# Patient Record
Sex: FEMALE | Race: WHITE | NOT HISPANIC OR LATINO | Employment: OTHER | URBAN - METROPOLITAN AREA
[De-identification: names, ages, dates, MRNs, and addresses within clinical notes are randomized per-mention and may not be internally consistent; named-entity substitution may affect disease eponyms.]

---

## 2017-01-12 ENCOUNTER — OFFICE VISIT (OUTPATIENT)
Dept: PULMONOLOGY | Facility: CLINIC | Age: 67
End: 2017-01-12
Payer: MEDICARE

## 2017-01-12 ENCOUNTER — HOSPITAL ENCOUNTER (OUTPATIENT)
Dept: RADIOLOGY | Facility: HOSPITAL | Age: 67
Discharge: HOME | End: 2017-01-12
Attending: INTERNAL MEDICINE
Payer: MEDICARE

## 2017-01-12 VITALS
HEART RATE: 50 BPM | DIASTOLIC BLOOD PRESSURE: 76 MMHG | WEIGHT: 186.94 LBS | OXYGEN SATURATION: 99 % | SYSTOLIC BLOOD PRESSURE: 122 MMHG | BODY MASS INDEX: 31.91 KG/M2 | RESPIRATION RATE: 18 BRPM | HEIGHT: 64 IN

## 2017-01-12 DIAGNOSIS — R06.00 DYSPNEA AND RESPIRATORY ABNORMALITIES: Primary | ICD-10-CM

## 2017-01-12 DIAGNOSIS — R06.00 DYSPNEA AND RESPIRATORY ABNORMALITIES: ICD-10-CM

## 2017-01-12 DIAGNOSIS — R06.89 DYSPNEA AND RESPIRATORY ABNORMALITIES: Primary | ICD-10-CM

## 2017-01-12 DIAGNOSIS — R06.89 DYSPNEA AND RESPIRATORY ABNORMALITIES: ICD-10-CM

## 2017-01-12 PROCEDURE — 1157F ADVNC CARE PLAN IN RCRD: CPT | Mod: S$GLB,,, | Performed by: INTERNAL MEDICINE

## 2017-01-12 PROCEDURE — 99215 OFFICE O/P EST HI 40 MIN: CPT | Mod: S$GLB,,, | Performed by: INTERNAL MEDICINE

## 2017-01-12 PROCEDURE — 1159F MED LIST DOCD IN RCRD: CPT | Mod: S$GLB,,, | Performed by: INTERNAL MEDICINE

## 2017-01-12 PROCEDURE — 1160F RVW MEDS BY RX/DR IN RCRD: CPT | Mod: S$GLB,,, | Performed by: INTERNAL MEDICINE

## 2017-01-12 PROCEDURE — 71020 XR CHEST PA AND LATERAL: CPT | Mod: TC

## 2017-01-12 PROCEDURE — 71020 XR CHEST PA AND LATERAL: CPT | Mod: 26,,, | Performed by: RADIOLOGY

## 2017-01-12 PROCEDURE — 99999 PR PBB SHADOW E&M-EST. PATIENT-LVL III: CPT | Mod: PBBFAC,,, | Performed by: INTERNAL MEDICINE

## 2017-01-12 RX ORDER — HYDROCODONE BITARTRATE AND ACETAMINOPHEN 5; 325 MG/1; MG/1
TABLET ORAL
Refills: 0 | COMMUNITY
Start: 2016-11-08 | End: 2017-07-19 | Stop reason: CLARIF

## 2017-01-12 RX ORDER — FLUTICASONE PROPIONATE AND SALMETEROL 250; 50 UG/1; UG/1
1 POWDER RESPIRATORY (INHALATION) 2 TIMES DAILY
COMMUNITY
End: 2017-01-12

## 2017-01-12 RX ORDER — DUREZOL 0.5 MG/ML
EMULSION OPHTHALMIC
Refills: 1 | COMMUNITY
Start: 2016-12-22 | End: 2017-01-12

## 2017-01-12 RX ORDER — FERROUS SULFATE 325(65) MG
325 TABLET ORAL
COMMUNITY

## 2017-01-12 RX ORDER — ALPRAZOLAM 1 MG/1
TABLET ORAL
Refills: 3 | COMMUNITY
Start: 2016-12-30

## 2017-01-12 NOTE — ASSESSMENT & PLAN NOTE
Intermittent wheezing.   CXR obtained and reviewed. She has pulmonary edema. In addition to her bi pedal edema suggests volume overload.   Bronchial challenge with methacholine to rule out Asthma scheduled 02/06/17.

## 2017-01-12 NOTE — PROGRESS NOTES
Subjective:      Patient ID: Fang Cervantes is a 66 y.o. female.    Patient Active Problem List   Diagnosis    Dyspnea and respiratory abnormalities    Decreased diffusion capacity of lung     Problem list has been reviewed.    Chief Complaint: Wheezing (c/o sob x 1wk)    HPI    She reports wheezing for the past 1 week. She denies cough, fevers or chills. She reports associated progressive dyspnea with exertion. She reports stable weight. She does not have COPD.      Previous Report Reviewed: office notes     The following portions of the patient's history were reviewed and updated as appropriate: She  has no past medical history on file.  She  has no past surgical history on file.  Her family history is not on file.  She  reports that she has never smoked. She does not have any smokeless tobacco history on file. She reports that she does not drink alcohol. Her drug history is not on file.  She has a current medication list which includes the following prescription(s): alprazolam, atorvastatin, bystolic, calcitriol, clopidogrel, epoetin fanta, ferrous sulfate, furosemide, losartan, nitroglycerin, oxycodone-acetaminophen, vitamin d2, amlodipine, hydrocodone-acetaminophen 5-325mg, and ventolin hfa.  She is allergic to aspirin; cephalosporins; codeine; and pcn [penicillins]..    Review of Systems   Constitutional: Positive for fatigue. Negative for fever, chills and night sweats.   HENT: Positive for rhinorrhea and sinus pressure. Negative for nosebleeds, postnasal drip, sore throat, trouble swallowing, ear pain and hearing loss.    Eyes: Negative for itching.   Respiratory: Positive for wheezing, orthopnea, dyspnea on extertion and use of rescue inhaler. Negative for cough, sputum production, chest tightness and Paroxysmal Nocturnal Dyspnea.    Cardiovascular: Negative for chest pain, palpitations and leg swelling.   Genitourinary: Negative for difficulty urinating and hematuria.   Endocrine: Negative for  "polydipsia and heat intolerance.    Musculoskeletal: Positive for arthralgias.   Skin: Negative for rash.   Gastrointestinal: Positive for acid reflux. Negative for nausea, vomiting, abdominal pain and abdominal distention.   Neurological: Positive for headaches. Negative for dizziness and light-headedness.   Hematological: Bleeds easily and excessive bruising.   Psychiatric/Behavioral: The patient is nervous/anxious.    All other systems reviewed and are negative.     Objective:     Visit Vitals    /76    Pulse (!) 50    Resp 18    Ht 5' 4" (1.626 m)    Wt 84.8 kg (186 lb 15.2 oz)    LMP  (LMP Unknown)    SpO2 99%    BMI 32.09 kg/m2     Body mass index is 32.09 kg/(m^2).    Physical Exam   Constitutional: She is oriented to person, place, and time. She appears well-developed and well-nourished.   HENT:   Head: Normocephalic and atraumatic.   Eyes: EOM are normal. Pupils are equal, round, and reactive to light.   Neck: Normal range of motion. Neck supple.   Cardiovascular: Normal rate and regular rhythm.    Pulmonary/Chest: Effort normal and breath sounds normal.   Abdominal: Soft. Bowel sounds are normal.   Musculoskeletal: Normal range of motion.   Neurological: She is alert and oriented to person, place, and time.   Skin: Skin is warm and dry.   Psychiatric: She has a normal mood and affect. Her behavior is normal.   Nursing note and vitals reviewed.      Personal Diagnostic Review  Chest x-ray: Comparison is made with the prior chest radiograph dated October 4, 2016.Heart size is enlarged. Lungs demonstrate mild interstitial prominence without acute consolidation. No new significant osseous findings.. There are no pleural effusions.   2 views     Assessment:     1. Dyspnea and respiratory abnormalities      Outpatient Encounter Prescriptions as of 1/12/2017   Medication Sig Dispense Refill    alprazolam (XANAX) 1 MG tablet   3    atorvastatin (LIPITOR) 20 MG tablet   11    BYSTOLIC 5 mg Tab   5 "    calcitRIOL (ROCALTROL) 0.5 MCG Cap   5    clopidogrel (PLAVIX) 75 mg tablet Take 75 mg by mouth once daily.      epoetin fanta (PROCRIT) 10,000 unit/mL injection Inject into the skin.      ferrous sulfate (FEROSUL) 325 mg (65 mg iron) Tab tablet Take 325 mg by mouth daily with breakfast.      furosemide (LASIX) 40 MG tablet Take 40 mg by mouth 2 (two) times daily.      losartan (COZAAR) 100 MG tablet   5    nitroGLYCERIN (NITROSTAT) 0.4 MG SL tablet Place 0.4 mg under the tongue every 5 (five) minutes as needed for Chest pain.      oxycodone-acetaminophen (PERCOCET)  mg per tablet   0    VITAMIN D2 50,000 unit capsule   12    [DISCONTINUED] fluticasone-salmeterol 250-50 mcg/dose (ADVAIR) 250-50 mcg/dose diskus inhaler Inhale 1 puff into the lungs 2 (two) times daily.      amlodipine (NORVASC) 10 MG tablet   5    hydrocodone-acetaminophen 5-325mg (NORCO) 5-325 mg per tablet   0    VENTOLIN HFA 90 mcg/actuation inhaler   5    [DISCONTINUED] DUREZOL 0.05 % Drop ophthalmic solution   1     No facility-administered encounter medications on file as of 1/12/2017.      Orders Placed This Encounter   Procedures    X-Ray Chest PA And Lateral     Standing Status:   Future     Number of Occurrences:   1     Standing Expiration Date:   1/12/2018     Order Specific Question:   May the Radiologist modify the order per protocol to meet the clinical needs of the patient?     Answer:   Yes    Bronchial challenge with methacholine     Standing Status:   Future     Standing Expiration Date:   1/12/2018     Plan:     Discussed diagnosis, its evaluation, treatment and usual course. All questions answered.    Dyspnea and respiratory abnormalities  Intermittent wheezing.   CXR obtained and reviewed. She has pulmonary edema. In addition to her bi pedal edema suggests volume overload.   Bronchial challenge with methacholine to rule out Asthma scheduled 02/06/17.       TIME SPENT WITH PATIENT: Time spent: 40 minutes in  face to face  discussion concerning diagnosis, prognosis, review of lab and test results, benefits of treatment as well as management of disease, counseling of patient and coordination of care between various health  care providers . Greater than half the time spent was used for coordination of care and counseling of patient.          Return in about 4 weeks (around 2/6/2017) for Shortness of breath.

## 2017-01-12 NOTE — PATIENT INSTRUCTIONS
Lung Anatomy  Your lungs take air in to give your body oxygen, which the body needs to work. Your lungs, like all the tissues in your body, are made up of billions of tiny specialized cells. Old lung cells die and are replaced by new, identical lung cells. This natural process helps ensure healthy lungs.    © 8554-9106 [email or web address]. [address] [place] [address], [place] [address]. All rights reserved. This information is not intended as a substitute for professional medical care. Always follow your healthcare professional's instructions.

## 2017-02-06 ENCOUNTER — OFFICE VISIT (OUTPATIENT)
Dept: PULMONOLOGY | Facility: CLINIC | Age: 67
End: 2017-02-06
Payer: MEDICARE

## 2017-02-06 ENCOUNTER — PROCEDURE VISIT (OUTPATIENT)
Dept: PULMONOLOGY | Facility: CLINIC | Age: 67
End: 2017-02-06
Payer: MEDICARE

## 2017-02-06 VITALS
WEIGHT: 184 LBS | BODY MASS INDEX: 29.57 KG/M2 | HEIGHT: 66 IN | SYSTOLIC BLOOD PRESSURE: 148 MMHG | HEART RATE: 55 BPM | OXYGEN SATURATION: 96 % | DIASTOLIC BLOOD PRESSURE: 70 MMHG | RESPIRATION RATE: 18 BRPM

## 2017-02-06 DIAGNOSIS — R06.89 DYSPNEA AND RESPIRATORY ABNORMALITIES: Chronic | ICD-10-CM

## 2017-02-06 DIAGNOSIS — R06.00 DYSPNEA AND RESPIRATORY ABNORMALITIES: Chronic | ICD-10-CM

## 2017-02-06 DIAGNOSIS — J45.20 MILD INTERMITTENT ASTHMA WITHOUT COMPLICATION: Primary | Chronic | ICD-10-CM

## 2017-02-06 LAB
POST FEF 25 75: 1.65 L/S (ref 1.38–2.62)
POST FET 100: 7.34 S
POST FEV1 FVC: 84 %
POST FEV1: 1.37 L (ref 2.05–2.64)
POST FIF 50: 1.61 L/S
POST FVC: 1.64 L (ref 2.74–3.43)
POST PEF: 4.09 L/S (ref 4.96–6.68)
PRE FEF 25 75: 1.39 L/S (ref 1.38–2.62)
PRE FET 100: 7.29 S
PRE FEV1 FVC: 80 %
PRE FEV1: 1.51 L (ref 2.05–2.64)
PRE FIF 50: 1.94 L/S
PRE FVC: 1.89 L (ref 2.74–3.43)
PRE PEF: 6.14 L/S (ref 4.96–6.68)
PREDICTED FEV1 FVC: 76.36 % (ref 71.46–81.26)
PREDICTED FEV1: 2.35 L (ref 2.05–2.64)
PREDICTED FVC: 3.09 L (ref 2.74–3.43)
PROVOCATION PROTOCOL: ABNORMAL

## 2017-02-06 PROCEDURE — 99999 PR PBB SHADOW E&M-EST. PATIENT-LVL III: CPT | Mod: PBBFAC,,, | Performed by: INTERNAL MEDICINE

## 2017-02-06 PROCEDURE — 94070 EVALUATION OF WHEEZING: CPT | Mod: S$GLB,,, | Performed by: INTERNAL MEDICINE

## 2017-02-06 PROCEDURE — 1157F ADVNC CARE PLAN IN RCRD: CPT | Mod: S$GLB,,, | Performed by: INTERNAL MEDICINE

## 2017-02-06 PROCEDURE — 99214 OFFICE O/P EST MOD 30 MIN: CPT | Mod: 25,S$GLB,, | Performed by: INTERNAL MEDICINE

## 2017-02-06 PROCEDURE — 1159F MED LIST DOCD IN RCRD: CPT | Mod: S$GLB,,, | Performed by: INTERNAL MEDICINE

## 2017-02-06 PROCEDURE — 1160F RVW MEDS BY RX/DR IN RCRD: CPT | Mod: S$GLB,,, | Performed by: INTERNAL MEDICINE

## 2017-02-06 PROCEDURE — 1126F AMNT PAIN NOTED NONE PRSNT: CPT | Mod: S$GLB,,, | Performed by: INTERNAL MEDICINE

## 2017-02-06 NOTE — ASSESSMENT & PLAN NOTE
Asthma ROS: taking medications as instructed, no medication side effects noted, no significant ongoing wheezing or shortness of breath, using bronchodilator MDI less than twice a week.   New concerns: None.   Exam: appears well, vitals normal, no respiratory distress, acyanotic, normal RR, chest clear to auscultation, no wheezes, rales or rhonchi, symmetric air entry.   Assessment:  Asthma well controlled.   Plan: Start Combivent Respimat per orders as documented in EMR. CXR in 3 months.

## 2017-02-06 NOTE — PATIENT INSTRUCTIONS
Albuterol; Ipratropium inhalation aerosol  What is this medicine?  ALBUTEROL; IPRATROPIUM (al BYOO ter ole; i pra CHRIS meyerse um) has two bronchodilators. It helps open up the airways in your lungs to make it easier to breathe.This medicine is used to treat chronic obstructive pulmonary disease (COPD).  How should I use this medicine?  This medicine is for inhalation only. Follow the instructions on your prescription label. Do not use more often than directed. Make sure that you are using your inhaler correctly. Ask you doctor or health care provider if you have any questions.  Talk to your pediatrician regarding the use of this medicine in children. Special care may be needed.  What side effects may I notice from receiving this medicine?  Side effects that you should report to your doctor or health care professional as soon as possible:  · allergic reactions like skin rash, itching or hives, swelling of the face, lips, or tongue  · breathing problems  · feeling faint or lightheaded, falls  · fever  · high blood pressure  · irregular heartbeat or chest pain  · muscle cramps or weakness  · pain, tingling, numbness in the hands or feet  · vomiting  Side effects that usually do not require medical attention (report to your doctor or health care professional if they continue or are bothersome):  · blurred vision  · cough  · difficulty passing urine  · difficulty sleeping  · headache  · nervousness or trembling  · stuffy or runny nose  · unusual taste  · upset stomach  What may interact with this medicine?  Do not take this medicine with any of the following medications:  · MAOIs like Carbex, Eldepryl, Marplan, Nardil, and Parnate  This medicine may also interact with the following medications:  · diuretics  · medicines for depression, anxiety, or psychotic disturbances  · medicines for irregular heartbeat  · medicines for weight loss including some herbal products  · methadone  · pimozide  · sertindole  · some medicines  for blood pressure or the heart  What if I miss a dose?  If you miss a dose, use it as soon as you can. If it is almost time for your next dose, use only that dose. Do not use double or extra doses.  Where should I keep my medicine?  Keep out of the reach of children.  Store at a room temperature between 15 and 30 degrees C (59 and 86 degrees F). Do not freeze. This medicine does not work as well if it is too cold. Protect from humidity. Never throw the container into a fire or incinerator. Keep track of the number of sprays used and discard after 200 sprays. Throw away any unused medicine after the expiration date.  What should I tell my health care provider before I take this medicine?  They need to know if you have any of the following conditions:  · heart disease  · high blood pressure  · irregular heartbeat  · an unusual or allergic reaction to albuterol, ipratropium, atropine, soya protein, soybeans or peanuts, other medicines, foods, dyes, or preservatives  · pregnant or trying to get pregnant  · breast-feeding  What should I watch for while using this medicine?  Tell your doctor or health care professional if your symptoms do not improve. If your breathing gets worse while you are using this medicine, call your doctor right away. Do not stop using your medicine unless your doctor tells you to.  Your mouth may get dry. Chewing sugarless gum or sucking hard candy, and drinking plenty of water may help. Contact your doctor if the problem does not go away or is severe.  You may get dizzy or have blurred vision. Do not drive, use machinery, or do anything that needs mental alertness until you know how this medicine affects you. Do not stand or sit up quickly, especially if you are an older patient. This reduces the risk of dizzy or fainting spells.  Date Last Reviewed:   NOTE:This sheet is a summary. It may not cover all possible information. If you have questions about this medicine, talk to your doctor,  pharmacist, or health care provider. Copyright© 2016 [employer]

## 2017-02-06 NOTE — PROGRESS NOTES
Subjective:      Patient ID: Fang Cervantes is a 66 y.o. female.    Patient Active Problem List   Diagnosis    Dyspnea and respiratory abnormalities    Decreased diffusion capacity of lung    Mild intermittent asthma without complication     Problem list has been reviewed.    Chief Complaint: Shortness of Breath (rev meth)    HPI    Methacholine challenge test reviewd with pateint who voiced understanding. She states that she  is fine and has no specific pulmonary complaints. She denies cough sputum, hemoptysis,  pain with breathing, wheezing, asthma.   A full  review of systems, past , family  and social histories was performed except as mentioned in the note above, these are non contributory to the main issues discussed today.    Previous Report Reviewed: office notes     The following portions of the patient's history were reviewed and updated as appropriate: She  has no past medical history on file.  She  has no past surgical history on file.  Her family history is not on file.  She  reports that she has never smoked. She does not have any smokeless tobacco history on file. She reports that she does not drink alcohol. Her drug history is not on file.  She has a current medication list which includes the following prescription(s): alprazolam, amlodipine, atorvastatin, bystolic, calcitriol, clopidogrel, epoetin fanta, ferrous sulfate, furosemide, hydrocodone-acetaminophen 5-325mg, losartan, nitroglycerin, oxycodone-acetaminophen, vitamin d2, and ipratropium-albuterol.  She is allergic to aspirin; cephalosporins; codeine; and pcn [penicillins]..    Review of Systems   Constitutional: Positive for fatigue. Negative for fever, chills and night sweats.   HENT: Positive for rhinorrhea and sinus pressure. Negative for nosebleeds, postnasal drip, sore throat, trouble swallowing, ear pain and hearing loss.    Eyes: Negative for itching.   Respiratory: Positive for shortness of breath, wheezing, orthopnea, dyspnea on  "extertion and use of rescue inhaler. Negative for cough, sputum production, chest tightness and Paroxysmal Nocturnal Dyspnea.    Cardiovascular: Negative for chest pain, palpitations and leg swelling.   Genitourinary: Negative for difficulty urinating and hematuria.   Endocrine: Negative for polydipsia and heat intolerance.    Musculoskeletal: Positive for arthralgias.   Skin: Negative for rash.   Gastrointestinal: Positive for acid reflux. Negative for nausea, vomiting, abdominal pain and abdominal distention.   Neurological: Positive for headaches. Negative for dizziness and light-headedness.   Hematological: Bleeds easily and excessive bruising.   Psychiatric/Behavioral: The patient is nervous/anxious.    All other systems reviewed and are negative.     Objective:     Visit Vitals    BP (!) 148/70    Pulse (!) 55    Resp 18    Ht 5' 6" (1.676 m)    Wt 83.5 kg (184 lb)    LMP  (LMP Unknown)    SpO2 96%    BMI 29.7 kg/m2     Body mass index is 29.7 kg/(m^2).    Physical Exam   Constitutional: She is oriented to person, place, and time. She appears well-developed and well-nourished.   HENT:   Head: Normocephalic and atraumatic.   Eyes: EOM are normal. Pupils are equal, round, and reactive to light.   Neck: Normal range of motion. Neck supple.   Cardiovascular: Normal rate and regular rhythm.    Pulmonary/Chest: Effort normal and breath sounds normal.   Abdominal: Soft. Bowel sounds are normal.   Musculoskeletal: Normal range of motion.   Neurological: She is alert and oriented to person, place, and time.   Skin: Skin is warm and dry.   Psychiatric: She has a normal mood and affect. Her behavior is normal.   Nursing note and vitals reviewed.      Personal Diagnostic Review  Methacholine challenge test: Mild bronchial hyper responsiveness. Positive test.     Assessment:     1. Mild intermittent asthma without complication Active     Outpatient Encounter Prescriptions as of 2/6/2017   Medication Sig Dispense " Refill    alprazolam (XANAX) 1 MG tablet   3    amlodipine (NORVASC) 10 MG tablet   5    atorvastatin (LIPITOR) 20 MG tablet   11    BYSTOLIC 5 mg Tab   5    calcitRIOL (ROCALTROL) 0.5 MCG Cap   5    clopidogrel (PLAVIX) 75 mg tablet Take 75 mg by mouth once daily.      epoetin fanta (PROCRIT) 10,000 unit/mL injection Inject into the skin.      ferrous sulfate (FEROSUL) 325 mg (65 mg iron) Tab tablet Take 325 mg by mouth daily with breakfast.      furosemide (LASIX) 40 MG tablet Take 40 mg by mouth 2 (two) times daily.      hydrocodone-acetaminophen 5-325mg (NORCO) 5-325 mg per tablet   0    losartan (COZAAR) 100 MG tablet   5    nitroGLYCERIN (NITROSTAT) 0.4 MG SL tablet Place 0.4 mg under the tongue every 5 (five) minutes as needed for Chest pain.      oxycodone-acetaminophen (PERCOCET)  mg per tablet   0    VITAMIN D2 50,000 unit capsule   12    [DISCONTINUED] VENTOLIN HFA 90 mcg/actuation inhaler   5    ipratropium-albuterol (COMBIVENT)  mcg/actuation inhaler Inhale 1 puff into the lungs every 6 (six) hours as needed for Wheezing. Rescue 4 g 6     No facility-administered encounter medications on file as of 2/6/2017.      Orders Placed This Encounter   Procedures    X-Ray Chest PA And Lateral     Standing Status:   Future     Standing Expiration Date:   3/19/2018     Plan:     Discussed diagnosis, its evaluation, treatment and usual course. All questions answered.    Mild intermittent asthma without complication  Asthma ROS: taking medications as instructed, no medication side effects noted, no significant ongoing wheezing or shortness of breath, using bronchodilator MDI less than twice a week.   New concerns: None.   Exam: appears well, vitals normal, no respiratory distress, acyanotic, normal RR, chest clear to auscultation, no wheezes, rales or rhonchi, symmetric air entry.   Assessment:  Asthma well controlled.   Plan: Start Combivent Respimat per orders as documented in  EMR.      TIME SPENT WITH PATIENT: Time spent:  30  minutes in face to face  discussion concerning diagnosis, prognosis, review of lab and test results, benefits of treatment as well as management of disease, counseling of patient and coordination of care between various health  care providers . Greater than half the time spent was used for coordination of care and counseling of patient.        Return in about 3 months (around 5/6/2017) for Asthma.

## 2017-02-06 NOTE — PROCEDURES
See Completed PFT. If the PFT is not available for review please call Pulmonary Lab. Methacholine challenge study.

## 2017-05-03 ENCOUNTER — TELEPHONE (OUTPATIENT)
Dept: PULMONOLOGY | Facility: CLINIC | Age: 67
End: 2017-05-03

## 2017-05-03 NOTE — TELEPHONE ENCOUNTER
----- Message from Gillian Jewell sent at 5/3/2017 12:16 PM CDT -----  Patient has an appointment on May 11 and an xray.  She wants to know why is needs another xray?  Call her at [phone].                                     [email or web address]

## 2017-06-14 ENCOUNTER — TELEPHONE (OUTPATIENT)
Dept: PULMONOLOGY | Facility: CLINIC | Age: 67
End: 2017-06-14

## 2017-06-15 ENCOUNTER — HOSPITAL ENCOUNTER (OUTPATIENT)
Dept: RADIOLOGY | Facility: HOSPITAL | Age: 67
Discharge: HOME | End: 2017-06-15
Attending: INTERNAL MEDICINE
Payer: MEDICARE

## 2017-06-15 ENCOUNTER — OFFICE VISIT (OUTPATIENT)
Dept: PULMONOLOGY | Facility: CLINIC | Age: 67
End: 2017-06-15
Payer: MEDICARE

## 2017-06-15 VITALS
BODY MASS INDEX: 27.72 KG/M2 | RESPIRATION RATE: 18 BRPM | OXYGEN SATURATION: 99 % | HEIGHT: 66 IN | HEART RATE: 53 BPM | WEIGHT: 172.5 LBS | DIASTOLIC BLOOD PRESSURE: 70 MMHG | SYSTOLIC BLOOD PRESSURE: 148 MMHG

## 2017-06-15 DIAGNOSIS — J45.20 MILD INTERMITTENT ASTHMA WITHOUT COMPLICATION: Chronic | ICD-10-CM

## 2017-06-15 DIAGNOSIS — R94.2 DECREASED DIFFUSION CAPACITY OF LUNG: Chronic | ICD-10-CM

## 2017-06-15 DIAGNOSIS — J45.20 MILD INTERMITTENT ASTHMA WITHOUT COMPLICATION: Primary | Chronic | ICD-10-CM

## 2017-06-15 PROCEDURE — 71020 XR CHEST PA AND LATERAL: CPT | Mod: 26,,, | Performed by: RADIOLOGY

## 2017-06-15 PROCEDURE — 1126F AMNT PAIN NOTED NONE PRSNT: CPT | Mod: S$GLB,,, | Performed by: INTERNAL MEDICINE

## 2017-06-15 PROCEDURE — 99214 OFFICE O/P EST MOD 30 MIN: CPT | Mod: S$GLB,,, | Performed by: INTERNAL MEDICINE

## 2017-06-15 PROCEDURE — 99999 PR PBB SHADOW E&M-EST. PATIENT-LVL III: CPT | Mod: PBBFAC,,, | Performed by: INTERNAL MEDICINE

## 2017-06-15 PROCEDURE — 1159F MED LIST DOCD IN RCRD: CPT | Mod: S$GLB,,, | Performed by: INTERNAL MEDICINE

## 2017-06-15 RX ORDER — SODIUM BICARBONATE 650 MG/1
TABLET ORAL
Refills: 4 | COMMUNITY
Start: 2017-05-18

## 2017-06-15 RX ORDER — FLUTICASONE PROPIONATE AND SALMETEROL 50; 250 UG/1; UG/1
POWDER RESPIRATORY (INHALATION)
Refills: 5 | COMMUNITY
Start: 2017-05-06 | End: 2017-06-15

## 2017-06-15 RX ORDER — ASPIRIN 81 MG/1
81 TABLET ORAL DAILY
COMMUNITY

## 2017-06-15 RX ORDER — CLINDAMYCIN HYDROCHLORIDE 150 MG/1
CAPSULE ORAL
COMMUNITY
Start: 2017-06-12

## 2017-06-15 NOTE — ASSESSMENT & PLAN NOTE
Stable and controlled. No changes to current therapeutic plan. Continue all previously prescribed medications.

## 2017-06-15 NOTE — PROGRESS NOTES
Subjective:      Patient ID: Fang Cervantes is a 66 y.o. female.    Patient Active Problem List   Diagnosis    Decreased diffusion capacity of lung    Mild intermittent asthma without complication     Problem list has been reviewed.    Chief Complaint: Asthma    HPI    Her for follow up. CXR reviewed with pateint who voiced understanding. Methacholine challenge test reviewed with pateint who voiced understanding. She states that she  is fine and has no specific pulmonary complaints. She denies cough sputum, hemoptysis,  pain with breathing, wheezing, asthma. Her current respiratory therapy regimen is  COMBIVENT which provides some relief. She is adherent with her regimen.   A full  review of systems, past , family  and social histories was performed except as mentioned in the note above, these are non contributory to the main issues discussed today.    Asthma Control Test  In the past 4  weeks, how much of the time did your asthma keep you from getting as much done at work, school or at home?: None of the time  During the past 4 weeks, how often have you had shortness of breath?: Not at all  During the past 4 weeks, how often did your asthma symptoms (wheezing, couging, shortness of breath, chest tightness or pain) wake you up at night or earlier than usual in the morning?: Not at all  During the past 4 weeks, how often have you used your rescue inhaler or nebulizer medication (such as albuterol)?: Not at all  How would you rate your asthma control during the past 4 weeks?: Completely controlled  If your score is 19 or less, your asthma may not be under control: 25     Previous Report Reviewed: office notes     The following portions of the patient's history were reviewed and updated as appropriate: She  has a past medical history of Asthma.  She  has no past surgical history on file.  Her family history is not on file.  She  reports that she has never smoked. She does not have any smokeless tobacco history on  "file. She reports that she does not drink alcohol. Her drug history is not on file.  She has a current medication list which includes the following prescription(s): alprazolam, amlodipine, aspirin, atorvastatin, bystolic, calcitriol, clindamycin, epoetin fanta, ferrous sulfate, furosemide, hydrocodone-acetaminophen 5-325mg, ipratropium-albuterol, losartan, nitroglycerin, oxycodone-acetaminophen, sodium bicarbonate, and vitamin d2.  She is allergic to alum-mag hydroxide-simeth; aspirin; cephalosporins; codeine; iodine and iodide containing products; and pcn [penicillins]..    Review of Systems   Constitutional: Positive for fatigue. Negative for fever, chills and night sweats.   HENT: Positive for rhinorrhea and sinus pressure. Negative for nosebleeds, postnasal drip, sore throat, trouble swallowing, ear pain and hearing loss.    Eyes: Negative for itching.   Respiratory: Positive for dyspnea on extertion. Negative for cough, sputum production, chest tightness and Paroxysmal Nocturnal Dyspnea.    Cardiovascular: Negative for chest pain, palpitations and leg swelling.   Genitourinary: Negative for difficulty urinating and hematuria.   Endocrine: Negative for polydipsia and heat intolerance.    Musculoskeletal: Positive for arthralgias.   Skin: Negative for rash.   Gastrointestinal: Positive for acid reflux. Negative for nausea, vomiting, abdominal pain and abdominal distention.   Neurological: Negative for dizziness, light-headedness and headaches.   Hematological: Excessive bruising.   Psychiatric/Behavioral: The patient is nervous/anxious.    All other systems reviewed and are negative.     Objective:     BP (!) 148/70 (BP Location: Right arm, Patient Position: Sitting, BP Method: Manual)   Pulse (!) 53   Resp 18   Ht 5' 6" (1.676 m)   Wt 78.3 kg (172 lb 8.2 oz)   LMP  (LMP Unknown)   SpO2 99%   BMI 27.84 kg/m²   Body mass index is 27.84 kg/m².    Physical Exam   Constitutional: She is oriented to person, " place, and time. She appears well-developed and well-nourished.   HENT:   Head: Normocephalic and atraumatic.   Eyes: EOM are normal. Pupils are equal, round, and reactive to light.   Neck: Normal range of motion. Neck supple.   Cardiovascular: Normal rate and regular rhythm.    Pulmonary/Chest: Effort normal and breath sounds normal.   Abdominal: Soft. Bowel sounds are normal.   Musculoskeletal: Normal range of motion.   Neurological: She is alert and oriented to person, place, and time.   Skin: Skin is warm and dry.   Psychiatric: She has a normal mood and affect. Her behavior is normal.   Nursing note and vitals reviewed.      Personal Diagnostic Review  CXR: [facility].     Assessment:     1. Mild intermittent asthma without complication Stable   2. Decreased diffusion capacity of lung Stable     Outpatient Encounter Prescriptions as of 6/15/2017   Medication Sig Dispense Refill    alprazolam (XANAX) 1 MG tablet   3    amlodipine (NORVASC) 10 MG tablet   5    aspirin (ECOTRIN) 81 MG EC tablet Take 81 mg by mouth once daily.      atorvastatin (LIPITOR) 20 MG tablet   11    BYSTOLIC 5 mg Tab   5    calcitRIOL (ROCALTROL) 0.5 MCG Cap   5    clindamycin (CLEOCIN) 150 MG capsule       epoetin fanta (PROCRIT) 10,000 unit/mL injection Inject into the skin.      ferrous sulfate (FEROSUL) 325 mg (65 mg iron) Tab tablet Take 325 mg by mouth daily with breakfast.      furosemide (LASIX) 40 MG tablet Take 40 mg by mouth 2 (two) times daily.      hydrocodone-acetaminophen 5-325mg (NORCO) 5-325 mg per tablet   0    ipratropium-albuterol (COMBIVENT)  mcg/actuation inhaler Inhale 1 puff into the lungs every 6 (six) hours as needed for Wheezing. Rescue 4 g 6    losartan (COZAAR) 100 MG tablet   5    nitroGLYCERIN (NITROSTAT) 0.4 MG SL tablet Place 0.4 mg under the tongue every 5 (five) minutes as needed for Chest pain.      oxycodone-acetaminophen (PERCOCET)  mg per tablet   0    sodium bicarbonate 650 MG  tablet   4    VITAMIN D2 50,000 unit capsule   12    [DISCONTINUED] ADVAIR DISKUS 250-50 mcg/dose diskus inhaler   5    [DISCONTINUED] clopidogrel (PLAVIX) 75 mg tablet Take 75 mg by mouth once daily.       No facility-administered encounter medications on file as of 6/15/2017.      Orders Placed This Encounter   Procedures    Complete PFT with bronchodilator     Standing Status:   Future     Standing Expiration Date:   7/26/2018     Plan:     Discussed diagnosis, its evaluation, treatment and usual course. All questions answered.    Mild intermittent asthma without complication  Asthma ROS: taking medications as instructed, no medication side effects noted, no significant ongoing wheezing or shortness of breath, using bronchodilator MDI less than twice a week.   New concerns: None.   Exam: appears well, vitals normal, no respiratory distress, acyanotic, normal RR, chest clear to auscultation, no wheezes, rales or rhonchi, symmetric air entry.   Assessment:  Asthma well controlled.   Plan: Continue Combivent Respimat per orders as documented in EMR. PFT in 6 months. .     Decreased diffusion capacity of lung  Stable and controlled. No changes to current therapeutic plan. Continue all previously prescribed medications.         TIME SPENT WITH PATIENT: Time spent:  30  minutes in face to face  discussion concerning diagnosis, prognosis, review of lab and test results, benefits of treatment as well as management of disease, counseling of patient and coordination of care between various health  care providers . Greater than half the time spent was used for coordination of care and counseling of patient.        Return in about 6 months (around 12/15/2017) for Asthma.

## 2017-06-15 NOTE — PATIENT INSTRUCTIONS
Lung Anatomy  Your lungs take air in to give your body oxygen, which the body needs to work. Your lungs, like all the tissues in your body, are made up of billions of tiny specialized cells. Old lung cells die and are replaced by new, identical lung cells. This natural process helps ensure healthy lungs.    Date Last Reviewed: 11/1/2016  © 9316-5257 [employer]. [address] [place] [address]. All rights reserved. This information is not intended as a substitute for professional medical care. Always follow your healthcare professional's instructions.

## 2017-06-15 NOTE — ASSESSMENT & PLAN NOTE
Asthma ROS: taking medications as instructed, no medication side effects noted, no significant ongoing wheezing or shortness of breath, using bronchodilator MDI less than twice a week.   New concerns: None.   Exam: appears well, vitals normal, no respiratory distress, acyanotic, normal RR, chest clear to auscultation, no wheezes, rales or rhonchi, symmetric air entry.   Assessment:  Asthma well controlled.   Plan: Continue Combivent Respimat per orders as documented in EMR. PFT in 6 months. .

## 2017-07-19 ENCOUNTER — HOSPITAL ENCOUNTER (EMERGENCY)
Facility: HOSPITAL | Age: 67
Discharge: HOME | End: 2017-07-19
Payer: MEDICARE

## 2017-07-19 VITALS
SYSTOLIC BLOOD PRESSURE: 152 MMHG | HEART RATE: 53 BPM | BODY MASS INDEX: 29.02 KG/M2 | WEIGHT: 170 LBS | DIASTOLIC BLOOD PRESSURE: 62 MMHG | OXYGEN SATURATION: 99 % | TEMPERATURE: 98 F | RESPIRATION RATE: 20 BRPM | HEIGHT: 64 IN

## 2017-07-19 DIAGNOSIS — L03.119 CELLULITIS AND ABSCESS OF LEG: Primary | ICD-10-CM

## 2017-07-19 DIAGNOSIS — L02.419 CELLULITIS AND ABSCESS OF LEG: Primary | ICD-10-CM

## 2017-07-19 PROCEDURE — 99283 EMERGENCY DEPT VISIT LOW MDM: CPT

## 2017-07-19 NOTE — DISCHARGE INSTRUCTIONS
Keep wound clean and dry.  Clean with antibacterial soap  Apply warm compresses four times daily to the wound to help draw out infection.  Take antibiotic as prescribed for full course of treatment.  Bactroban ointment three times day.  Keep wound covered when going outside or working.    If symptoms worsen or fail to improve with treatment, fever occurs, or if wound increases in size or fails to respond to antibiotic, see your Primary Care Provider or go to the nearest Emergency Room.

## 2017-07-19 NOTE — ED PROVIDER NOTES
SCRIBE #1 NOTE: I, Celestina Vega, am scribing for, and in the presence of, ELIZABETH Epps. I have scribed the entire note.      History      Chief Complaint   Patient presents with    Cellulitis     states had cut on leg and has been on antibiotics and it has not healed. states her doctor was not in today and was told to come here       Review of patient's allergies indicates:   Allergen Reactions    Alum-mag hydroxide-simeth     Aspirin     Cephalosporins     Codeine     Iodine and iodide containing products     Pcn [penicillins]         HPI   HPI    7/19/2017, 12:45 PM   History obtained from the patient      History of Present Illness: Fang Cervantes is a 66 y.o. female patient who presents to the Emergency Department for evaluation of RLE cellulitis. She reports that she hit her leg on a piece of metal 2 months ago and that the area recently became infected. She began taking Clindamycin 2 days ago and states that her cellulitis has not improved. Symptoms are constant and moderate in severity. Prior tx include Percocet for pain. Pain is exacerbated with palpation. Patient denies fever, chills, drainage, N/V, and all other sxs at this time. She states that her PCP was not in today. No further complaints or concerns at this time.     Arrival mode: Personal vehicle    PCP: Justo Cardozo MD       Past Medical History:  Past Medical History:   Diagnosis Date    Asthma     Hyperlipidemia     Hypertension     Renal disorder        Past Surgical History:  Past Surgical History:   Procedure Laterality Date    CARDIAC SURGERY      GASTRIC BYPASS      HYSTERECTOMY      TUBAL LIGATION           Family History:  History reviewed. No pertinent family history.    Social History:  Social History     Social History Main Topics    Smoking status: Never Smoker    Smokeless tobacco: Unknown    Alcohol use No    Drug use: Unknown    Sexual activity: Unknown       ROS   Review of Systems   Constitutional:  "Negative for chills and fever.   HENT: Negative for sore throat.    Respiratory: Negative for shortness of breath.    Cardiovascular: Negative for chest pain.   Gastrointestinal: Negative for nausea and vomiting.   Genitourinary: Negative for dysuria.   Musculoskeletal: Negative for back pain and gait problem.   Skin: Positive for color change (erythema). Negative for rash.   Neurological: Negative for weakness and numbness.   Hematological: Does not bruise/bleed easily.   All other systems reviewed and are negative.      Physical Exam      Initial Vitals [07/19/17 1236]   BP Pulse Resp Temp SpO2   (!) 152/62 (!) 53 20 98.2 °F (36.8 °C) 99 %      MAP       92          Physical Exam  Nursing Notes and Vital Signs  Reviewed.  Constitutional: Patient is in no acute distress. Awake and alert. Well-developed and well-nourished.  Head: Atraumatic. Normocephalic.  Eyes: PERRL. EOM intact. Conjunctivae are not pale. No scleral icterus.  ENT: Mucous membranes are moist. Oropharynx is clear and symmetric.    Neck: Supple. Full ROM.   Cardiovascular: Regular rate. Regular rhythm. No murmurs, rubs, or gallops.   Pulmonary/Chest: No respiratory distress. Clear to auscultation bilaterally. No wheezing, rales, or rhonchi.  Abdominal: Soft. Non-distended.  Musculoskeletal: Moves all extremities. No obvious deformities. No edema.   Skin: Warm and dry. 1 cm abscess with 3 cm area of surrounding erythema. No active drainage.  Neurological:  Alert, awake, and appropriate.  Normal speech.  No acute focal neurological deficits are appreciated.  Psychiatric: Normal affect. Good eye contact. Appropriate in content.    ED Course    Procedures  ED Vital Signs:  Vitals:    07/19/17 1236   BP: (!) 152/62   Pulse: (!) 53   Resp: 20   Temp: 98.2 °F (36.8 °C)   TempSrc: Oral   SpO2: 99%   Weight: 77.1 kg (170 lb)   Height: 5' 4" (1.626 m)     The Emergency Provider reviewed the vital signs and test results, which are outlined above.    ED " Discussion     12:56 PM: Discussed pt dx.  Patient refused I&D of abscess.   Informed patient to continue taking abx as prescribed and to clean area with antibacterial soap and water. Informed patient to f/u with PCP. All questions and concerns were addressed at this time. Pt expresses understanding of information and instructions, and is comfortable with plan to discharge. Pt is stable for discharge.    I discussed with patient and/or family/caretaker that evaluation in the ED does not suggest any emergent or life threatening medical conditions requiring immediate intervention beyond what was provided in the ED, and I believe patient is safe for discharge.  Regardless, an unremarkable evaluation in the ED does not preclude the development or presence of a serious of life threatening condition. As such, patient was instructed to return immediately for any worsening or change in current symptoms.    Pre-hypertension/Hypertension: The pt has been informed that they may have pre-hypertension or hypertension based on a blood pressure reading in the ED. I recommend that the pt call the PCP listed on their discharge instructions or a physician of their choice this week to arrange f/u for further evaluation of possible pre-hypertension or hypertension.       ED Medication(s):  Medications - No data to display    Current Discharge Medication List          Follow-up Information     Justo Cardozo MD In 3 days.    Specialty:  Internal Medicine  Contact information:  [address] [place] [id number] [phone]                    Medical Decision Making              Scribe Attestation:   Scribe #1: I performed the above scribed service and the documentation accurately describes the services I performed. I attest to the accuracy of the note.    Attending:   Physician Attestation Statement for Scribe #1: I, ELIZABETH Epps, personally performed the services described in this documentation, as scribed by Celestina Vega in  my presence, and it is both accurate and complete.          Clinical Impression       ICD-10-CM ICD-9-CM   1. Cellulitis and abscess of leg L03.119 682.6    L02.419        Disposition:   Disposition: Discharged  Condition: Stable         Gwen Duvall PA-C  07/19/17 3265